# Patient Record
Sex: MALE | Race: WHITE | Employment: FULL TIME | URBAN - METROPOLITAN AREA
[De-identification: names, ages, dates, MRNs, and addresses within clinical notes are randomized per-mention and may not be internally consistent; named-entity substitution may affect disease eponyms.]

---

## 2017-12-15 ENCOUNTER — OFFICE VISIT (OUTPATIENT)
Dept: FAMILY MEDICINE CLINIC | Facility: CLINIC | Age: 52
End: 2017-12-15

## 2017-12-15 DIAGNOSIS — I10 ESSENTIAL HYPERTENSION: Primary | ICD-10-CM

## 2018-10-06 ENCOUNTER — OFFICE VISIT (OUTPATIENT)
Dept: FAMILY MEDICINE CLINIC | Facility: CLINIC | Age: 53
End: 2018-10-06
Payer: COMMERCIAL

## 2018-10-06 VITALS
HEIGHT: 66 IN | HEART RATE: 104 BPM | SYSTOLIC BLOOD PRESSURE: 170 MMHG | WEIGHT: 248 LBS | TEMPERATURE: 98 F | BODY MASS INDEX: 39.86 KG/M2 | RESPIRATION RATE: 16 BRPM | DIASTOLIC BLOOD PRESSURE: 110 MMHG

## 2018-10-06 DIAGNOSIS — I10 UNCONTROLLED HYPERTENSION: ICD-10-CM

## 2018-10-06 DIAGNOSIS — L30.9 ECZEMA, UNSPECIFIED TYPE: ICD-10-CM
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
## 2018-10-06 DIAGNOSIS — S49.91XA INJURY OF RIGHT SHOULDER, INITIAL ENCOUNTER: Primary | ICD-10-CM

## 2018-10-06 DIAGNOSIS — Z13.228 SCREENING FOR ENDOCRINE, NUTRITIONAL, METABOLIC AND IMMUNITY DISORDER: ICD-10-CM

## 2018-10-06 DIAGNOSIS — Z12.11 SCREEN FOR COLON CANCER: ICD-10-CM

## 2018-10-06 DIAGNOSIS — I10 ESSENTIAL HYPERTENSION: ICD-10-CM

## 2018-10-06 DIAGNOSIS — Z13.0 SCREENING FOR ENDOCRINE, NUTRITIONAL, METABOLIC AND IMMUNITY DISORDER: ICD-10-CM

## 2018-10-06 DIAGNOSIS — Z13.29 SCREENING FOR ENDOCRINE, NUTRITIONAL, METABOLIC AND IMMUNITY DISORDER: ICD-10-CM

## 2018-10-06 DIAGNOSIS — Z13.21 SCREENING FOR ENDOCRINE, NUTRITIONAL, METABOLIC AND IMMUNITY DISORDER: ICD-10-CM

## 2018-10-06 PROCEDURE — 99214 OFFICE O/P EST MOD 30 MIN: CPT | Performed by: FAMILY MEDICINE

## 2018-10-06 RX ORDER — BETAMETHASONE DIPROPIONATE 0.05 %
OINTMENT (GRAM) TOPICAL
Qty: 45 G | Refills: 1 | Status: SHIPPED | OUTPATIENT
Start: 2018-10-06 | End: 2018-10-22

## 2018-10-06 RX ORDER — AMLODIPINE BESYLATE 10 MG/1
TABLET ORAL
COMMUNITY
Start: 2018-10-04 | End: 2019-07-31

## 2018-10-06 RX ORDER — CYCLOBENZAPRINE HCL 10 MG
10 TABLET ORAL 2 TIMES DAILY PRN
Qty: 30 TABLET | Refills: 0 | Status: SHIPPED | OUTPATIENT
Start: 2018-10-06 | End: 2018-10-26

## 2018-10-06 RX ORDER — NAPROXEN SODIUM 550 MG/1
550 TABLET ORAL 2 TIMES DAILY PRN
Qty: 30 TABLET | Refills: 1 | Status: SHIPPED | OUTPATIENT
Start: 2018-10-06 | End: 2018-11-14

## 2018-10-06 RX ORDER — LOSARTAN POTASSIUM AND HYDROCHLOROTHIAZIDE 25; 100 MG/1; MG/1
1 TABLET ORAL DAILY
Qty: 30 TABLET | Refills: 6 | Status: SHIPPED | OUTPATIENT
Start: 2018-10-06 | End: 2018-12-04

## 2018-10-06 NOTE — PROGRESS NOTES
Yasir Quintero is a 52year old male. HPI:       Patient's been out of his blood pressure medication of HCTZ 25 mg for Hypertension. He currently denies any new complaints.   His blood pressure has been better and remains on avg 200's over 100's, has bee lb   BMI 40.03 kg/m²   GENERAL: well developed, well nourished,in no apparent distress  SKIN: no rashes,no suspicious lesions  HEENT: atraumatic, normocephalic,ears-normal. and throat are clear, he has post nasal drip.    NECK: supple,no adenopathy,no bruit

## 2018-10-08 ENCOUNTER — TELEPHONE (OUTPATIENT)
Dept: FAMILY MEDICINE CLINIC | Facility: CLINIC | Age: 53
End: 2018-10-08

## 2018-10-08 NOTE — TELEPHONE ENCOUNTER
Patient signed medical records authorization form for the below Facility to disclose health information to [facility]:      Facility / Provider [address] Phone:   Facility Fax: [phone]    SANTOS sent to scanning. Fax confirmation @ 2:07 p.m.

## 2018-10-12 ENCOUNTER — OFFICE VISIT (OUTPATIENT)
Dept: FAMILY MEDICINE CLINIC | Facility: CLINIC | Age: 53
End: 2018-10-12
Payer: COMMERCIAL

## 2018-10-12 ENCOUNTER — LAB ENCOUNTER (OUTPATIENT)
Dept: LAB | Age: 53
End: 2018-10-12
Attending: FAMILY MEDICINE
Payer: COMMERCIAL

## 2018-10-12 VITALS
HEART RATE: 92 BPM | TEMPERATURE: 98 F | SYSTOLIC BLOOD PRESSURE: 140 MMHG | BODY MASS INDEX: 40.5 KG/M2 | HEIGHT: 66 IN | DIASTOLIC BLOOD PRESSURE: 102 MMHG | WEIGHT: 252 LBS | RESPIRATION RATE: 16 BRPM

## 2018-10-12 DIAGNOSIS — S49.91XD SHOULDER INJURY, RIGHT, SUBSEQUENT ENCOUNTER: ICD-10-CM

## 2018-10-12 DIAGNOSIS — I10 ESSENTIAL HYPERTENSION: Primary | ICD-10-CM

## 2018-10-12 DIAGNOSIS — S46.911D SHOULDER STRAIN, RIGHT, SUBSEQUENT ENCOUNTER: ICD-10-CM
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
## 2018-10-12 DIAGNOSIS — Z13.0 SCREENING FOR ENDOCRINE, NUTRITIONAL, METABOLIC AND IMMUNITY DISORDER: ICD-10-CM

## 2018-10-12 DIAGNOSIS — Z13.29 SCREENING FOR ENDOCRINE, NUTRITIONAL, METABOLIC AND IMMUNITY DISORDER: ICD-10-CM

## 2018-10-12 DIAGNOSIS — Z13.228 SCREENING FOR ENDOCRINE, NUTRITIONAL, METABOLIC AND IMMUNITY DISORDER: ICD-10-CM

## 2018-10-12 DIAGNOSIS — Z13.21 SCREENING FOR ENDOCRINE, NUTRITIONAL, METABOLIC AND IMMUNITY DISORDER: ICD-10-CM

## 2018-10-12 PROCEDURE — 84443 ASSAY THYROID STIM HORMONE: CPT

## 2018-10-12 PROCEDURE — 85025 COMPLETE CBC W/AUTO DIFF WBC: CPT

## 2018-10-12 PROCEDURE — 99214 OFFICE O/P EST MOD 30 MIN: CPT | Performed by: FAMILY MEDICINE

## 2018-10-12 PROCEDURE — 80053 COMPREHEN METABOLIC PANEL: CPT

## 2018-10-12 PROCEDURE — 80061 LIPID PANEL: CPT

## 2018-10-12 PROCEDURE — 36415 COLL VENOUS BLD VENIPUNCTURE: CPT

## 2018-11-06 ENCOUNTER — TELEPHONE (OUTPATIENT)
Dept: FAMILY MEDICINE CLINIC | Facility: CLINIC | Age: 53
End: 2018-11-06

## 2018-11-06 NOTE — TELEPHONE ENCOUNTER
Pt came into office, with orders from Dr. Silvestre Pichardo. Pt will be scheduling his surgery soon and will contact us to schedule his preop appt. Pt wanted us to have his surgery orders now.  Pink sheet was filled out for Pt and was placed in the Doctors fol

## 2018-11-07 DIAGNOSIS — Z01.818 PRE-OP EVALUATION: Primary | ICD-10-CM

## 2018-11-12 ENCOUNTER — MED REC SCAN ONLY (OUTPATIENT)
Dept: FAMILY MEDICINE CLINIC | Facility: CLINIC | Age: 53
End: 2018-11-12

## 2018-11-12 ENCOUNTER — TELEPHONE (OUTPATIENT)
Dept: FAMILY MEDICINE CLINIC | Facility: CLINIC | Age: 53
End: 2018-11-12

## 2018-11-12 NOTE — TELEPHONE ENCOUNTER
Pt is scheduled for his pre op on 11/14/18 for his surgery on 12/06/18 with Dr. Josiah Thornton at [facility] [place]. Deangelo alcocer in Dr dugan.

## 2018-11-14 ENCOUNTER — LAB ENCOUNTER (OUTPATIENT)
Dept: LAB | Age: 53
End: 2018-11-14
Attending: FAMILY MEDICINE
Payer: COMMERCIAL

## 2018-11-14 ENCOUNTER — OFFICE VISIT (OUTPATIENT)
Dept: FAMILY MEDICINE CLINIC | Facility: CLINIC | Age: 53
End: 2018-11-14
Payer: COMMERCIAL

## 2018-11-14 ENCOUNTER — HOSPITAL ENCOUNTER (OUTPATIENT)
Dept: GENERAL RADIOLOGY | Age: 53
Discharge: HOME | End: 2018-11-14
Attending: FAMILY MEDICINE
Payer: COMMERCIAL

## 2018-11-14 DIAGNOSIS — Z01.818 PRE-OP EVALUATION: ICD-10-CM

## 2018-11-14 DIAGNOSIS — E55.9 VITAMIN D DEFICIENCY: ICD-10-CM

## 2018-11-14 DIAGNOSIS — E78.5 HYPERLIPIDEMIA WITH TARGET LOW DENSITY LIPOPROTEIN (LDL) CHOLESTEROL LESS THAN 130 MG/DL: ICD-10-CM

## 2018-11-14 DIAGNOSIS — Z01.818 PRE-OP EVALUATION: Primary | ICD-10-CM

## 2018-11-14 DIAGNOSIS — I10 ESSENTIAL HYPERTENSION: ICD-10-CM

## 2018-11-14 PROCEDURE — 81001 URINALYSIS AUTO W/SCOPE: CPT

## 2018-11-14 PROCEDURE — 36415 COLL VENOUS BLD VENIPUNCTURE: CPT

## 2018-11-14 PROCEDURE — 80053 COMPREHEN METABOLIC PANEL: CPT

## 2018-11-14 PROCEDURE — 87086 URINE CULTURE/COLONY COUNT: CPT

## 2018-11-14 PROCEDURE — 71046 X-RAY EXAM CHEST 2 VIEWS: CPT | Performed by: FAMILY MEDICINE

## 2018-11-14 PROCEDURE — 85025 COMPLETE CBC W/AUTO DIFF WBC: CPT

## 2018-11-14 PROCEDURE — 99243 OFF/OP CNSLTJ NEW/EST LOW 30: CPT | Performed by: FAMILY MEDICINE

## 2018-11-14 PROCEDURE — 85610 PROTHROMBIN TIME: CPT

## 2018-11-14 PROCEDURE — 93000 ELECTROCARDIOGRAM COMPLETE: CPT | Performed by: FAMILY MEDICINE

## 2018-11-14 RX ORDER — LOSARTAN POTASSIUM 50 MG/1
50 TABLET ORAL DAILY
Qty: 30 TABLET | Refills: 3 | Status: SHIPPED | OUTPATIENT
Start: 2018-11-14 | End: 2019-07-31

## 2018-11-14 NOTE — PROGRESS NOTES
Kelli Bowling is a 52year old male who presents for a pre-operative physical exam.   HPI related to surgery:   Kelli Bowling is presenting for surgery per request of Dr. Faisal Nair scheduled for a right shoulder SLAP, rotator cuff repair, possible subscapula vomiting, constipation, diarrhea;   GENITAL/: no dysuria, urgency or frequency  MUSCULOSKELETAL: no joint problems upper or lower extremities  NEURO: no convulsions, abnormal sensation, no issues with sleeping.   PSYCHE: no hyper- or hypoactivity, good en

## 2018-11-15 ENCOUNTER — TELEPHONE (OUTPATIENT)
Dept: FAMILY MEDICINE CLINIC | Facility: CLINIC | Age: 53
End: 2018-11-15

## 2018-11-15 NOTE — TELEPHONE ENCOUNTER
----- Message from Shabana Fowler MD sent at 11/15/2018 10:11 AM CST -----  Normal, recheck UA in 3 months. Cleared for surgery.

## 2018-11-15 NOTE — TELEPHONE ENCOUNTER
Urine culture is still in process. Called patient and spoke with him. Advised patient of chest xray results below. Also advised that patient of blood lab results below.   Advised patient that urine culture is still in process and once we have this re

## 2018-11-16 VITALS
HEART RATE: 64 BPM | RESPIRATION RATE: 16 BRPM | SYSTOLIC BLOOD PRESSURE: 138 MMHG | HEIGHT: 66 IN | DIASTOLIC BLOOD PRESSURE: 88 MMHG | TEMPERATURE: 98 F | BODY MASS INDEX: 39.7 KG/M2 | WEIGHT: 247 LBS

## 2018-11-16 NOTE — TELEPHONE ENCOUNTER
Collected:  11/14/2018  1:34 PM   Status:  Final result   Dx:  Pre-op evaluation   Specimen Information: Urine, clean catch        URINE CULTURE No Growth at 18-24 hrs.           Resulting Agency: [facility]         Specimen Collected: 11/14/18  1:34 PM

## 2018-11-16 NOTE — TELEPHONE ENCOUNTER
Patient called checking on the status to make sure he is ok to have his surgery that was moved up . Please call patient at [phone].

## 2018-11-19 ENCOUNTER — TELEPHONE (OUTPATIENT)
Dept: FAMILY MEDICINE CLINIC | Facility: CLINIC | Age: 53
End: 2018-11-19

## 2018-11-19 NOTE — TELEPHONE ENCOUNTER
Called patient and spoke with him. Patient states that on Thursday he took his first dose of Losartan 50mg. Patient woke up on Friday with a \"slight\" fever, body aches and joint pains. Patient states that he thought he was getting the flu.   Patient di

## 2018-11-19 NOTE — TELEPHONE ENCOUNTER
Losartan Potassium 50 MG Oral Tab Sig :  Take 1 tablet (50 mg total) by mouth daily. The patient had a fever after starting this medication. The patient doesn't know if he should be taking only half?      The patient took one pill and felt like he had a

## 2018-11-21 NOTE — TELEPHONE ENCOUNTER
Called patient and spoke with him. Advised him of information below. Patient states understanding. Patient states that he will retry the medication but after the Thanksgiving Holiday because he does not want to feel ill for the holiday.   All questions a

## 2018-11-21 NOTE — TELEPHONE ENCOUNTER
I would retry taking medication and is feeling warm to continue with medication as long as symptoms are stable. Fever is not a side effect of medication.

## 2018-12-04 DIAGNOSIS — I10 UNCONTROLLED HYPERTENSION: ICD-10-CM

## 2018-12-04 RX ORDER — LOSARTAN POTASSIUM AND HYDROCHLOROTHIAZIDE 25; 100 MG/1; MG/1
1 TABLET ORAL DAILY
Qty: 90 TABLET | Refills: 1 | Status: SHIPPED | OUTPATIENT
Start: 2018-12-04 | End: 2019-03-27

## 2018-12-04 NOTE — TELEPHONE ENCOUNTER
Patient needs the following medication sent to [facility] and for 90 tablets: Losartan 100-25 MG.

## 2018-12-04 NOTE — TELEPHONE ENCOUNTER
Patient has Losartan 50mg and Losartan HCTZ 100mg/25mg on chart. Called patient and spoke with him. Patient states verified medication patient needed refilled. Patient states that its the Losartan HCTZ 100mg/25mg.   Patient states that he is tolerating t

## 2019-03-27 DIAGNOSIS — I10 UNCONTROLLED HYPERTENSION: ICD-10-CM

## 2019-03-27 RX ORDER — LOSARTAN POTASSIUM AND HYDROCHLOROTHIAZIDE 25; 100 MG/1; MG/1
1 TABLET ORAL DAILY
Qty: 30 TABLET | Refills: 3 | Status: SHIPPED | OUTPATIENT
Start: 2019-03-27 | End: 2019-07-31

## 2019-03-27 NOTE — TELEPHONE ENCOUNTER
Medication(s) to Refill:   Requested Prescriptions     Pending Prescriptions Disp Refills   • LOSARTAN POTASSIUM-HCTZ 100-25 MG Oral Tab [Pharmacy Med Name: LOSARTAN-HCTZ 100-25 MG TAB] 30 tablet 3     Sig: TAKE 1 TABLET BY MOUTH DAILY         Reason for M

## 2019-04-09 ENCOUNTER — PATIENT OUTREACH (OUTPATIENT)
Dept: FAMILY MEDICINE CLINIC | Facility: CLINIC | Age: 54
End: 2019-04-09

## 2019-04-09 DIAGNOSIS — Z12.11 SCREENING FOR COLON CANCER: Primary | ICD-10-CM

## 2019-04-09 NOTE — PROGRESS NOTES
Contacted patient and he stated he has been off work so he does not have insurance. Patient stated when he gets insurance he will make a appointment. Let him know I put in a referral for [facility] when he gets his insurance.

## 2019-07-30 ENCOUNTER — TELEPHONE (OUTPATIENT)
Dept: FAMILY MEDICINE CLINIC | Facility: CLINIC | Age: 54
End: 2019-07-30

## 2019-07-31 ENCOUNTER — OFFICE VISIT (OUTPATIENT)
Dept: FAMILY MEDICINE CLINIC | Facility: CLINIC | Age: 54
End: 2019-07-31
Payer: COMMERCIAL

## 2019-07-31 VITALS
DIASTOLIC BLOOD PRESSURE: 78 MMHG | TEMPERATURE: 98 F | HEIGHT: 66 IN | BODY MASS INDEX: 40.02 KG/M2 | HEART RATE: 70 BPM | RESPIRATION RATE: 16 BRPM | WEIGHT: 249 LBS | SYSTOLIC BLOOD PRESSURE: 128 MMHG

## 2019-07-31 DIAGNOSIS — E78.5 HYPERLIPIDEMIA WITH TARGET LOW DENSITY LIPOPROTEIN (LDL) CHOLESTEROL LESS THAN 130 MG/DL: ICD-10-CM

## 2019-07-31 DIAGNOSIS — Z13.228 SCREENING FOR ENDOCRINE, NUTRITIONAL, METABOLIC AND IMMUNITY DISORDER: ICD-10-CM

## 2019-07-31 DIAGNOSIS — Z00.00 ANNUAL PHYSICAL EXAM: Primary | ICD-10-CM

## 2019-07-31 DIAGNOSIS — Z13.21 SCREENING FOR ENDOCRINE, NUTRITIONAL, METABOLIC AND IMMUNITY DISORDER: ICD-10-CM

## 2019-07-31 DIAGNOSIS — J40 BRONCHITIS: ICD-10-CM

## 2019-07-31 DIAGNOSIS — Z12.11 SCREEN FOR COLON CANCER: ICD-10-CM

## 2019-07-31 DIAGNOSIS — I10 ESSENTIAL HYPERTENSION: ICD-10-CM

## 2019-07-31 DIAGNOSIS — Z13.29 SCREENING FOR ENDOCRINE, NUTRITIONAL, METABOLIC AND IMMUNITY DISORDER: ICD-10-CM

## 2019-07-31 DIAGNOSIS — Z12.5 SCREENING PSA (PROSTATE SPECIFIC ANTIGEN): ICD-10-CM

## 2019-07-31 DIAGNOSIS — Z13.0 SCREENING FOR ENDOCRINE, NUTRITIONAL, METABOLIC AND IMMUNITY DISORDER: ICD-10-CM

## 2019-07-31 DIAGNOSIS — J32.0 CHRONIC MAXILLARY SINUSITIS: ICD-10-CM

## 2019-07-31 PROCEDURE — 99396 PREV VISIT EST AGE 40-64: CPT | Performed by: FAMILY MEDICINE

## 2019-07-31 PROCEDURE — 99214 OFFICE O/P EST MOD 30 MIN: CPT | Performed by: FAMILY MEDICINE

## 2019-07-31 RX ORDER — LOSARTAN POTASSIUM AND HYDROCHLOROTHIAZIDE 25; 100 MG/1; MG/1
1 TABLET ORAL DAILY
Qty: 90 TABLET | Refills: 3 | Status: SHIPPED | OUTPATIENT
Start: 2019-07-31 | End: 2020-05-05 | Stop reason: CLARIF

## 2019-07-31 RX ORDER — MONTELUKAST SODIUM 10 MG/1
10 TABLET ORAL DAILY
Qty: 30 TABLET | Refills: 3 | Status: SHIPPED | OUTPATIENT
Start: 2019-07-31 | End: 2019-11-26

## 2019-07-31 NOTE — PROGRESS NOTES
Miguel Esteban is a 62year old male. HPI:       Patient presents for recheck of his hypertension. Pt has been taking medications as instructed, no medication side effects, home BP monitoring in the range of 543'H systolic and 49'E diastolic.   Patient's b well nourished,in no apparent distress  SKIN: no rashes,no suspicious lesions  HEENT: atraumatic, normocephalic,ears-normal. and throat are clear, he has post nasal drip. NECK: supple,no adenopathy,no bruits  LUNGS: clear,  No wheezing or rhonchi.    CARD

## 2019-08-05 ENCOUNTER — TELEPHONE (OUTPATIENT)
Dept: FAMILY MEDICINE CLINIC | Facility: CLINIC | Age: 54
End: 2019-08-05

## 2019-08-05 NOTE — TELEPHONE ENCOUNTER
Patient Juan Galindo calling because script for antibiotic to treat sinus infection was not prescribed at 7-31 OV as discussed, please send to [facility]

## 2019-08-05 NOTE — TELEPHONE ENCOUNTER
Patient was seen on 7/31/19 and thought he was to get an antibiotic but nothing was sent. Please advise. Thank you! 7. Bronchitis  -stable, CPM     8.  Chronic maxillary sinusitis  -allergy control and oral abx for bacterial sinus infection     Follow

## 2019-08-06 RX ORDER — AZITHROMYCIN 250 MG/1
TABLET, FILM COATED ORAL
Qty: 6 TABLET | Refills: 0 | Status: SHIPPED | OUTPATIENT
Start: 2019-08-06 | End: 2020-05-05 | Stop reason: CLARIF

## 2019-08-06 NOTE — TELEPHONE ENCOUNTER
Rx sent to pharmacy for patient as requested. Called patient and spoke with him. Advised him of this information. Patient states understanding.

## 2019-08-19 ENCOUNTER — MED REC SCAN ONLY (OUTPATIENT)
Dept: FAMILY MEDICINE CLINIC | Facility: CLINIC | Age: 54
End: 2019-08-19

## 2019-09-10 ENCOUNTER — TELEPHONE (OUTPATIENT)
Dept: FAMILY MEDICINE CLINIC | Facility: CLINIC | Age: 54
End: 2019-09-10

## 2019-10-17 ENCOUNTER — TELEPHONE (OUTPATIENT)
Dept: FAMILY MEDICINE CLINIC | Facility: CLINIC | Age: 54
End: 2019-10-17

## 2019-10-28 RX ORDER — LOSARTAN POTASSIUM 100 MG/1
100 TABLET ORAL DAILY
Qty: 90 TABLET | Refills: 0 | Status: SHIPPED | OUTPATIENT
Start: 2019-10-28 | End: 2020-05-05

## 2019-10-28 RX ORDER — HYDROCHLOROTHIAZIDE 25 MG/1
25 TABLET ORAL DAILY
Qty: 90 TABLET | Refills: 0 | Status: SHIPPED | OUTPATIENT
Start: 2019-10-28 | End: 2020-05-05 | Stop reason: CLARIF

## 2019-11-09 ENCOUNTER — APPOINTMENT (OUTPATIENT)
Dept: LAB | Age: 54
End: 2019-11-09
Attending: FAMILY MEDICINE
Payer: COMMERCIAL

## 2019-11-09 DIAGNOSIS — Z12.5 SCREENING PSA (PROSTATE SPECIFIC ANTIGEN): ICD-10-CM

## 2019-11-13 ENCOUNTER — TELEPHONE (OUTPATIENT)
Dept: FAMILY MEDICINE CLINIC | Facility: CLINIC | Age: 54
End: 2019-11-13

## 2019-11-13 DIAGNOSIS — E78.5 HYPERLIPIDEMIA WITH TARGET LOW DENSITY LIPOPROTEIN (LDL) CHOLESTEROL LESS THAN 130 MG/DL: Primary | ICD-10-CM

## 2019-11-13 DIAGNOSIS — R73.09 ELEVATED GLUCOSE: ICD-10-CM

## 2019-11-13 NOTE — TELEPHONE ENCOUNTER
----- Message from Rachael Daily MD sent at 11/13/2019 10:09 AM CST -----  Patient's sugars slightly borderline as well as high cholesterol okay for low-carb diet need recheck of CMP and lipid in 6 months    PSA SCREEN   Notes recorded by Natalie Mcdonald MD o

## 2019-11-26 RX ORDER — MONTELUKAST SODIUM 10 MG/1
TABLET ORAL
Qty: 90 TABLET | Refills: 1 | Status: SHIPPED | OUTPATIENT
Start: 2019-11-26 | End: 2020-05-05

## 2019-11-26 NOTE — TELEPHONE ENCOUNTER
Medication(s) to Refill:   Requested Prescriptions     Pending Prescriptions Disp Refills   • MONTELUKAST SODIUM 10 MG Oral Tab [Pharmacy Med Name: MONTELUKAST SOD 10 MG TABLET] 90 tablet 1     Sig: TAKE 1 TABLET BY MOUTH EVERY DAY         Reason for Medic

## 2020-02-28 RX ORDER — LOSARTAN POTASSIUM 100 MG/1
100 TABLET ORAL DAILY
Qty: 90 TABLET | Refills: 0 | Status: SHIPPED | OUTPATIENT
Start: 2020-02-28 | End: 2020-05-05 | Stop reason: CLARIF

## 2020-02-28 RX ORDER — HYDROCHLOROTHIAZIDE 25 MG/1
25 TABLET ORAL DAILY
Qty: 90 TABLET | Refills: 0 | Status: SHIPPED | OUTPATIENT
Start: 2020-02-28 | End: 2020-05-05 | Stop reason: CLARIF

## 2020-05-05 ENCOUNTER — VIRTUAL PHONE E/M (OUTPATIENT)
Dept: FAMILY MEDICINE CLINIC | Facility: CLINIC | Age: 55
End: 2020-05-05
Payer: COMMERCIAL

## 2020-05-05 VITALS
SYSTOLIC BLOOD PRESSURE: 120 MMHG | BODY MASS INDEX: 40 KG/M2 | WEIGHT: 248 LBS | HEART RATE: 76 BPM | DIASTOLIC BLOOD PRESSURE: 70 MMHG

## 2020-05-05 DIAGNOSIS — E55.9 VITAMIN D DEFICIENCY: ICD-10-CM

## 2020-05-05 DIAGNOSIS — I10 ESSENTIAL HYPERTENSION: Primary | ICD-10-CM

## 2020-05-05 DIAGNOSIS — E78.5 HYPERLIPIDEMIA WITH TARGET LOW DENSITY LIPOPROTEIN (LDL) CHOLESTEROL LESS THAN 130 MG/DL: ICD-10-CM

## 2020-05-05 PROCEDURE — 99214 OFFICE O/P EST MOD 30 MIN: CPT | Performed by: FAMILY MEDICINE

## 2020-05-05 RX ORDER — LOSARTAN POTASSIUM 100 MG/1
TABLET ORAL
Qty: 90 TABLET | Refills: 0 | OUTPATIENT
Start: 2020-05-05

## 2020-05-05 RX ORDER — HYDROCHLOROTHIAZIDE 25 MG/1
TABLET ORAL
Qty: 90 TABLET | Refills: 0 | OUTPATIENT
Start: 2020-05-05

## 2020-05-05 RX ORDER — MONTELUKAST SODIUM 10 MG/1
10 TABLET ORAL DAILY
Qty: 90 TABLET | Refills: 1 | Status: SHIPPED | OUTPATIENT
Start: 2020-05-05 | End: 2021-08-18

## 2020-05-05 RX ORDER — LOSARTAN POTASSIUM 100 MG/1
100 TABLET ORAL DAILY
Qty: 90 TABLET | Refills: 1 | Status: SHIPPED | OUTPATIENT
Start: 2020-05-05 | End: 2021-08-18

## 2020-05-05 NOTE — PROGRESS NOTES
Please note that the following visit was completed using two-way, real-time interactive audio and video communication.   This has been done in good greta to provide continuity of care in the best interest of the provider-patient relationship, due to the on- back   • OTHER SURGICAL HISTORY      right shoulder     Social History:  Social History    Tobacco Use      Smoking status: Never Smoker      Smokeless tobacco: Never Used    Alcohol use: Yes      Comment: occasion    Drug use: No    Family History:  N oriented to person, place, and time. Psychiatric: He has a normal mood and affect. His behavior is normal. Judgment and thought content normal.        ASSESSMENT AND PLAN:   Diagnoses and all orders for this visit:  1.  Essential hypertension  -stable, wi

## 2020-11-20 ENCOUNTER — OFFICE VISIT (OUTPATIENT)
Dept: FAMILY MEDICINE CLINIC | Facility: CLINIC | Age: 55
End: 2020-11-20
Payer: COMMERCIAL

## 2020-11-20 VITALS
DIASTOLIC BLOOD PRESSURE: 92 MMHG | HEIGHT: 66 IN | OXYGEN SATURATION: 98 % | SYSTOLIC BLOOD PRESSURE: 144 MMHG | WEIGHT: 248 LBS | TEMPERATURE: 98 F | RESPIRATION RATE: 16 BRPM | HEART RATE: 73 BPM | BODY MASS INDEX: 39.86 KG/M2

## 2020-11-20 DIAGNOSIS — Z20.822 SUSPECTED COVID-19 VIRUS INFECTION: Primary | ICD-10-CM

## 2020-11-20 PROCEDURE — 3077F SYST BP >= 140 MM HG: CPT | Performed by: NURSE PRACTITIONER

## 2020-11-20 PROCEDURE — 99213 OFFICE O/P EST LOW 20 MIN: CPT | Performed by: NURSE PRACTITIONER

## 2020-11-20 PROCEDURE — 3080F DIAST BP >= 90 MM HG: CPT | Performed by: NURSE PRACTITIONER

## 2020-11-20 PROCEDURE — 3008F BODY MASS INDEX DOCD: CPT | Performed by: NURSE PRACTITIONER

## 2020-11-20 NOTE — PATIENT INSTRUCTIONS
Coronavirus Disease 2019 (COVID-19): Overview  Coronavirus disease 2019 (COVID-19) is a respiratory illness. It's caused by a new (novel) coronavirus. There are many types of coronavirus. Coronaviruses are a very common cause of colds and bronchitis.  The · New loss of sense of smell or taste  You can check your symptoms with the CDC’s Coronavirus Self-. What are possible complications from [id number]-[id number]? In many cases, this virus can cause infection (pneumonia) in both lungs.  In some cases, this can ca Your healthcare provider will ask about your symptoms. He or she will ask where you live, and about your recent travel, and any contact with sick people.  If your healthcare provider thinks you may have COVID-19, he or she will consider whether to test you · Imaging tests. You may have a chest X-ray or CT scan. Note about reinfection and your immunity  At this time, it's unclear if people can be reinfected with COVID-19.  The CDC notes that if a person has fully recovered from COVID-19 and is retested [facility] · Prone positioning. Depending on how sick you are during your hospital stay, your healthcare team may turn you regularly on your stomach. This is called prone positioning. It helps increase the amount of oxygen you get to your lungs.  Follow your healthca You are at risk for COVID-19 if you have had close contact with someone with the virus, or if you live in or traveled to an area with cases of it. Close contact means being within 6 feet of a person known to have COVID-19 for a total of 15 minutes or more.

## 2020-11-20 NOTE — PROGRESS NOTES
CHIEF COMPLAINT:   Patient presents with:  Testing: covid test, no exposure, no sx      HPI:   Piper Colon is a 60year old male who presents for a possible Covid 19 exposure.   He is unsure if he has been exposed however he states he works around [employer] THROAT: Oral mucosa pink, moist. Posterior pharynx is non erythematous. no exudates. NECK: Supple, non-tender  LUNGS: clear to auscultation bilaterally; good air movement. Breathing is non labored.   CARDIO: RRR without murmur  GI: active BS's x4,no mas Public health officials are working to find the source. How the virus spreads is not yet fully understood, but it seems to spread and infect people fairly easily.  Some people who have been infected in an area may not be sure how or where they were infected As experts learn more about [id number]-89, other complications are being reported that may be linked to COVID-19. Rarely, some children have developed severe complications called multisystem inflammatory syndrome in children (MIS-C).  MIS-C seems to be similar t · Viral test.  Viral tests tell if you have a current COVID-19 infection. A nose-throat swab may be wiped inside your nose to the back of your throat. Or a sample of your saliva may be taken.  Either of these samples will be checked for the SARS-CoV-2 virus At this time, it's unclear if people can be reinfected with COVID-19.  The CDC notes that if a person has fully recovered from COVID-19 and is retested within 3 months of the first infection, they may continue to have low levels of the virus in their body a · Prone positioning. Depending on how sick you are during your hospital stay, your healthcare team may turn you regularly on your stomach. This is called prone positioning. It helps increase the amount of oxygen you get to your lungs.  Follow your healthca You are at risk for COVID-19 if you have had close contact with someone with the virus, or if you live in or traveled to an area with cases of it. Close contact means being within 6 feet of a person known to have COVID-19 for a total of 15 minutes or more.

## 2020-11-25 ENCOUNTER — VIRTUAL PHONE E/M (OUTPATIENT)
Dept: FAMILY MEDICINE CLINIC | Facility: CLINIC | Age: 55
End: 2020-11-25
Payer: COMMERCIAL

## 2020-11-25 DIAGNOSIS — U07.1 COVID-19 VIRUS INFECTION: Primary | ICD-10-CM

## 2020-11-25 DIAGNOSIS — I10 ESSENTIAL HYPERTENSION: ICD-10-CM

## 2020-11-25 DIAGNOSIS — E78.5 HYPERLIPIDEMIA WITH TARGET LOW DENSITY LIPOPROTEIN (LDL) CHOLESTEROL LESS THAN 130 MG/DL: ICD-10-CM

## 2020-11-25 DIAGNOSIS — E55.9 VITAMIN D DEFICIENCY: ICD-10-CM

## 2020-11-25 PROCEDURE — 99214 OFFICE O/P EST MOD 30 MIN: CPT | Performed by: FAMILY MEDICINE

## 2020-11-25 NOTE — PROGRESS NOTES
TELE HEALTH VISIT     HPI:    Frances Christie is a 60year old male who presents for No chief complaint on file. Presenting via Tele health due to pandemic:   He reports mild s/s. Positive covid 11/20/20.    He reports feeling fatigue, he reports no re REVIEW OF SYSTEMS:   Patient denies shortness of breath, denies chest pain and denies any recent fevers or chills. Patient reports no urinary complaints and denies headaches or visual disturbances.    Patient denies any abdominal pain at this t Additionally, every conscious effort was taken to allow for sufficient and adequate time. This billing visit was spent on reviewing labs, medications, radiology tests and decision making.   Appropriate medical decision-making and tests are ordered as d

## 2021-08-18 ENCOUNTER — OFFICE VISIT (OUTPATIENT)
Dept: FAMILY MEDICINE CLINIC | Facility: CLINIC | Age: 56
End: 2021-08-18
Payer: COMMERCIAL

## 2021-08-18 VITALS
BODY MASS INDEX: 39.86 KG/M2 | DIASTOLIC BLOOD PRESSURE: 80 MMHG | HEART RATE: 71 BPM | RESPIRATION RATE: 16 BRPM | HEIGHT: 66 IN | SYSTOLIC BLOOD PRESSURE: 120 MMHG | OXYGEN SATURATION: 99 % | WEIGHT: 248 LBS

## 2021-08-18 DIAGNOSIS — J30.2 SEASONAL ALLERGIC RHINITIS, UNSPECIFIED TRIGGER: ICD-10-CM

## 2021-08-18 DIAGNOSIS — Z12.5 SCREENING FOR PROSTATE CANCER: ICD-10-CM

## 2021-08-18 DIAGNOSIS — I10 ESSENTIAL HYPERTENSION: ICD-10-CM

## 2021-08-18 DIAGNOSIS — E78.5 HYPERLIPIDEMIA WITH TARGET LOW DENSITY LIPOPROTEIN (LDL) CHOLESTEROL LESS THAN 130 MG/DL: ICD-10-CM

## 2021-08-18 DIAGNOSIS — E55.9 VITAMIN D DEFICIENCY: ICD-10-CM

## 2021-08-18 DIAGNOSIS — Z00.00 ROUTINE GENERAL MEDICAL EXAMINATION AT HEALTH CARE FACILITY: Primary | ICD-10-CM

## 2021-08-18 PROCEDURE — 3074F SYST BP LT 130 MM HG: CPT | Performed by: FAMILY MEDICINE

## 2021-08-18 PROCEDURE — 3079F DIAST BP 80-89 MM HG: CPT | Performed by: FAMILY MEDICINE

## 2021-08-18 PROCEDURE — 99396 PREV VISIT EST AGE 40-64: CPT | Performed by: FAMILY MEDICINE

## 2021-08-18 PROCEDURE — 3008F BODY MASS INDEX DOCD: CPT | Performed by: FAMILY MEDICINE

## 2021-08-18 RX ORDER — ERGOCALCIFEROL 1.25 MG/1
50000 CAPSULE ORAL WEEKLY
Qty: 12 CAPSULE | Refills: 0 | Status: SHIPPED | OUTPATIENT
Start: 2021-08-18 | End: 2021-09-17

## 2021-08-18 RX ORDER — LOSARTAN POTASSIUM 100 MG/1
100 TABLET ORAL DAILY
Qty: 90 TABLET | Refills: 1 | Status: SHIPPED | OUTPATIENT
Start: 2021-08-18

## 2021-08-18 RX ORDER — MONTELUKAST SODIUM 10 MG/1
10 TABLET ORAL DAILY
Qty: 90 TABLET | Refills: 1 | Status: SHIPPED | OUTPATIENT
Start: 2021-08-18

## 2021-08-18 NOTE — PROGRESS NOTES
Paulina Arce is a 60year old male. HPI:       Patient presents for recheck of his hypertension. Pt has been taking medications as instructed, no medication side effects, home BP monitoring in the range of 326'I systolic and 43'T diastolic.   Patient's b 6\" (1.676 m)   Wt 248 lb (112.5 kg)   SpO2 99%   BMI 40.03 kg/m²   GENERAL: well developed, well nourished,in no apparent distress  SKIN: no rashes,no suspicious lesions  HEENT: atraumatic, normocephalic,ears-normal. and throat are clear, he has post nasa

## 2021-10-20 DIAGNOSIS — E55.9 VITAMIN D DEFICIENCY: ICD-10-CM

## 2021-10-20 RX ORDER — ERGOCALCIFEROL 1.25 MG/1
CAPSULE ORAL
Qty: 12 CAPSULE | Refills: 3 | OUTPATIENT
Start: 2021-10-20

## 2022-01-27 ENCOUNTER — TELEPHONE (OUTPATIENT)
Dept: FAMILY MEDICINE CLINIC | Facility: CLINIC | Age: 57
End: 2022-01-27

## 2022-04-28 ENCOUNTER — TELEPHONE (OUTPATIENT)
Dept: FAMILY MEDICINE CLINIC | Facility: CLINIC | Age: 57
End: 2022-04-28

## 2023-01-11 ENCOUNTER — LAB ENCOUNTER (OUTPATIENT)
Dept: LAB | Age: 58
End: 2023-01-11
Attending: NURSE PRACTITIONER
Payer: COMMERCIAL

## 2023-01-11 DIAGNOSIS — I10 ESSENTIAL HYPERTENSION, MALIGNANT: ICD-10-CM

## 2023-01-11 DIAGNOSIS — I25.10 CORONARY ATHEROSCLEROSIS OF NATIVE CORONARY ARTERY: Primary | ICD-10-CM

## 2023-01-11 LAB
ALBUMIN SERPL-MCNC: 3.8 G/DL (ref 3.4–5)
ALBUMIN/GLOB SERPL: 1.2 {RATIO} (ref 1–2)
ALP LIVER SERPL-CCNC: 84 U/L
ALT SERPL-CCNC: 39 U/L
ANION GAP SERPL CALC-SCNC: 5 MMOL/L (ref 0–18)
AST SERPL-CCNC: 21 U/L (ref 15–37)
BILIRUB SERPL-MCNC: 0.8 MG/DL (ref 0.1–2)
BUN BLD-MCNC: 17 MG/DL (ref 7–18)
CALCIUM BLD-MCNC: 9.1 MG/DL (ref 8.5–10.1)
CHLORIDE SERPL-SCNC: 110 MMOL/L (ref 98–112)
CHOLEST SERPL-MCNC: 159 MG/DL (ref ?–200)
CO2 SERPL-SCNC: 28 MMOL/L (ref 21–32)
CREAT BLD-MCNC: 1 MG/DL
FASTING PATIENT LIPID ANSWER: YES
FASTING STATUS PATIENT QL REPORTED: YES
GFR SERPLBLD BASED ON 1.73 SQ M-ARVRAT: 87 ML/MIN/1.73M2 (ref 60–?)
GLOBULIN PLAS-MCNC: 3.2 G/DL (ref 2.8–4.4)
GLUCOSE BLD-MCNC: 119 MG/DL (ref 70–99)
HDLC SERPL-MCNC: 46 MG/DL (ref 40–59)
LDLC SERPL CALC-MCNC: 92 MG/DL (ref ?–100)
NONHDLC SERPL-MCNC: 113 MG/DL (ref ?–130)
OSMOLALITY SERPL CALC.SUM OF ELEC: 299 MOSM/KG (ref 275–295)
POTASSIUM SERPL-SCNC: 4.8 MMOL/L (ref 3.5–5.1)
PROT SERPL-MCNC: 7 G/DL (ref 6.4–8.2)
SODIUM SERPL-SCNC: 143 MMOL/L (ref 136–145)
TRIGL SERPL-MCNC: 116 MG/DL (ref 30–149)
VLDLC SERPL CALC-MCNC: 19 MG/DL (ref 0–30)

## 2023-01-11 PROCEDURE — 36415 COLL VENOUS BLD VENIPUNCTURE: CPT

## 2023-01-11 PROCEDURE — 80053 COMPREHEN METABOLIC PANEL: CPT

## 2023-01-11 PROCEDURE — 80061 LIPID PANEL: CPT

## 2023-02-03 ENCOUNTER — OFFICE VISIT (OUTPATIENT)
Dept: FAMILY MEDICINE CLINIC | Facility: CLINIC | Age: 58
End: 2023-02-03
Payer: COMMERCIAL

## 2023-02-03 VITALS
DIASTOLIC BLOOD PRESSURE: 84 MMHG | OXYGEN SATURATION: 98 % | WEIGHT: 251 LBS | HEIGHT: 66 IN | HEART RATE: 70 BPM | SYSTOLIC BLOOD PRESSURE: 130 MMHG | RESPIRATION RATE: 16 BRPM | BODY MASS INDEX: 40.34 KG/M2

## 2023-02-03 DIAGNOSIS — I25.10 CORONARY ARTERY DISEASE DUE TO CALCIFIED CORONARY LESION: ICD-10-CM

## 2023-02-03 DIAGNOSIS — Z12.5 SCREENING FOR PROSTATE CANCER: ICD-10-CM

## 2023-02-03 DIAGNOSIS — I25.84 CORONARY ARTERY DISEASE DUE TO CALCIFIED CORONARY LESION: ICD-10-CM

## 2023-02-03 DIAGNOSIS — R73.03 PRE-DIABETES: ICD-10-CM

## 2023-02-03 DIAGNOSIS — Z00.00 ROUTINE GENERAL MEDICAL EXAMINATION AT HEALTH CARE FACILITY: Primary | ICD-10-CM

## 2023-02-03 DIAGNOSIS — L30.9 ECZEMA, UNSPECIFIED TYPE: ICD-10-CM

## 2023-02-03 DIAGNOSIS — Z12.11 SCREENING FOR COLON CANCER: ICD-10-CM

## 2023-02-03 DIAGNOSIS — I10 ESSENTIAL HYPERTENSION: ICD-10-CM

## 2023-02-03 DIAGNOSIS — E78.5 HYPERLIPIDEMIA WITH TARGET LOW DENSITY LIPOPROTEIN (LDL) CHOLESTEROL LESS THAN 130 MG/DL: ICD-10-CM

## 2023-02-03 PROCEDURE — 3079F DIAST BP 80-89 MM HG: CPT | Performed by: FAMILY MEDICINE

## 2023-02-03 PROCEDURE — 3008F BODY MASS INDEX DOCD: CPT | Performed by: FAMILY MEDICINE

## 2023-02-03 PROCEDURE — 3075F SYST BP GE 130 - 139MM HG: CPT | Performed by: FAMILY MEDICINE

## 2023-02-03 PROCEDURE — 99396 PREV VISIT EST AGE 40-64: CPT | Performed by: FAMILY MEDICINE

## 2023-02-03 RX ORDER — ASPIRIN 81 MG/1
81 TABLET ORAL
COMMUNITY

## 2023-02-03 RX ORDER — EZETIMIBE 10 MG/1
TABLET ORAL
COMMUNITY
Start: 2023-01-30

## 2023-02-03 RX ORDER — METOPROLOL SUCCINATE 50 MG/1
50 TABLET, EXTENDED RELEASE ORAL
COMMUNITY
Start: 2022-11-08

## 2023-02-03 RX ORDER — TRIAMCINOLONE ACETONIDE 1 MG/G
CREAM TOPICAL 2 TIMES DAILY PRN
Qty: 60 G | Refills: 0 | Status: SHIPPED | OUTPATIENT
Start: 2023-02-03 | End: 2023-02-17

## 2023-02-03 RX ORDER — CLOTRIMAZOLE AND BETAMETHASONE DIPROPIONATE 10; .64 MG/G; MG/G
1 CREAM TOPICAL 2 TIMES DAILY
COMMUNITY
Start: 2022-12-20

## 2023-02-03 RX ORDER — ATORVASTATIN CALCIUM 80 MG/1
80 TABLET, FILM COATED ORAL NIGHTLY
COMMUNITY
Start: 2022-11-08

## 2024-01-23 NOTE — PROGRESS NOTES
On 01/24/24, Janel ESCOBEDO scribed the services personally performed by Ry Vazquez MD      Chief Complaint   Patient presents with    Office Visit    Follow-up       Visit: Subsequent     SUBJECTIVE:  Rainer Macario is a 35 year old male who presents to the clinic for their second post-op visit. The patient is s/p ANIA, septoplasty, and turbinate reduction on 12/15/2023. At last visit, 12/22/2023, endoscopic debridement was done and patient was started on Budesonide/saline rinses. Today, patient reports he is doing well. He mentions having a cold at end of December 2023 at which point he had some green mucus, but this has since resolved. He mentions flying recently and his ears felt off. He uses Budesonide/saline rinse at night and plain saline rinse in the morning. No facial pain/pressure, headaches, or snoring     PMHx/PSHx/Meds/Allergies/Social Hx/Family Hx/ROS reviewed from previous visit and remain unchanged.    PHYSICAL EXAMINATION:   Height 5' 11\" (1.803 m), weight 95.3 kg (210 lb).  --General appearance: Well developed, well nourished, in no apparent distress  --Ability to communicate: Appropriate  --Head and scalp: No scalp lesions  --Eyes: No redness, swelling or drainage.  --Ears:     R ear: EAC patent, tympanic membrane intact, no middle ear effusion    L ear: EAC patent, tympanic membrane intact, no middle ear effusion  --Oral Cavity/Oral Pharynx: No buccal mucosal lesions, palatal elevation symmetric, tongue motion intact, no floor of mouth palpable masses, no posterior pharyngeal wall fullness  --Nose: Septum healed well. Turbinates nicely reduced.   Respiratory: Good respiratory effort, no stridor or stertor  CV: No JVD  --Neck: Trachea midline,    evidence of hematoma.   --Skin: No pigmented lesions on face, neck  --Psychiatric: Oriented to time, place and person  --Lymphatic: No palpable cervical adenopathy     Procedure Note:     Procedure: Nasal endoscopy, diagnostic  Indication:  Ramona Leone is a 52year old male. HPI:       Patient's been out of his blood pressure medication of HCTZ 25 mg for Hypertension. He currently denies any new complaints.   His blood pressure has been better and remains on avg 200's over 100's, has bee Chronic sinusitis   EBL: None  Complications: None    Right side: Septum midline.  Inferior turbinate non hypertrophied.  middle meatus, and sphenoethmoid recess with Minimal edema, and no purulence or polyps. The middle turbinate was medialized with patent maxillary antrostomy and clear ethmoid labyrinth. Superior meatus  unremarkable.    Nasopharynx clear without mass or lesion.  Minimal mucus noted    Left side: Septum midline.  Inferior turbinate non hypertrophied. middle meatus, and sphenoethmoid recess with Minimal edema, and no purulence or polyps. The middle turbinate was medialized with patent maxillary antrostomy and clear ethmoid labyrinth.  Superior meatus  unremarkable. Nasopharynx clear without mass or lesion.      Patient tolerated procedure well with minimal discomfort.      ASSESSMENT:  Chronic sinusitis s/p ANIA, septoplasty, and turbinate reduction on 12/15/2023    PLAN:  - Patient doing well.   - Rigid nasal endoscopy done today and reviewed with patient as above.   - Continue Budesonide/saline rinses once daily.   - RTC in 4-6 month for recheck.   - Discussed with patient treatment plan as above, and patient agreeable.  All patient's questions and concerns were addressed and answered.    Ry Vazquez MD    Scribe: Electronically signed: Janel Bolden has scribed for Dr. Vazquez, 01/24/24  I have reviewed and edited the progress note and agree with what has been scribed. Electronically signed by: Ry Vazquez MD ,  01/24/24   Take 1 tablet (10 mg total) by mouth 2 (two) times daily as needed. Disp: 30 tablet Rfl: 0      History reviewed. No pertinent past medical history.    Social History:  Social History    Tobacco Use      Smoking status: Never Smoker      Smokeless tobacco:

## 2024-03-18 RX ORDER — TRIAMCINOLONE ACETONIDE 1 MG/G
1 CREAM TOPICAL AS NEEDED
Qty: 30 G | Refills: 1 | OUTPATIENT
Start: 2024-03-18

## 2024-03-18 NOTE — TELEPHONE ENCOUNTER
Annual physical appt is set for 04/08/24.     Pt will also like to know if Dr. Brown can please submit RX for losartan 100 MG Oral Tab prior to OV as he is completely out of this medication.

## 2024-03-18 NOTE — TELEPHONE ENCOUNTER
Requesting refill on triamcinolone cream.   LOV 2/3/2023.  Refill request denied. Due for physical.     PSR: Please assist with scheduling physical.

## 2024-04-08 ENCOUNTER — OFFICE VISIT (OUTPATIENT)
Dept: FAMILY MEDICINE CLINIC | Facility: CLINIC | Age: 59
End: 2024-04-08
Payer: COMMERCIAL

## 2024-04-08 VITALS
HEIGHT: 66 IN | OXYGEN SATURATION: 94 % | SYSTOLIC BLOOD PRESSURE: 138 MMHG | HEART RATE: 61 BPM | RESPIRATION RATE: 14 BRPM | BODY MASS INDEX: 41.78 KG/M2 | DIASTOLIC BLOOD PRESSURE: 84 MMHG | TEMPERATURE: 98 F | WEIGHT: 260 LBS

## 2024-04-08 DIAGNOSIS — Z12.5 SCREENING FOR PROSTATE CANCER: ICD-10-CM

## 2024-04-08 DIAGNOSIS — L03.032 ACUTE PARONYCHIA OF TOE, LEFT: ICD-10-CM

## 2024-04-08 DIAGNOSIS — Z00.00 ROUTINE GENERAL MEDICAL EXAMINATION AT A HEALTH CARE FACILITY: Primary | ICD-10-CM

## 2024-04-08 DIAGNOSIS — E78.5 HYPERLIPIDEMIA WITH TARGET LOW DENSITY LIPOPROTEIN (LDL) CHOLESTEROL LESS THAN 130 MG/DL: ICD-10-CM

## 2024-04-08 DIAGNOSIS — J30.2 SEASONAL ALLERGIC RHINITIS, UNSPECIFIED TRIGGER: ICD-10-CM

## 2024-04-08 DIAGNOSIS — Z12.11 SCREENING FOR COLON CANCER: ICD-10-CM

## 2024-04-08 DIAGNOSIS — L30.9 ECZEMA, UNSPECIFIED TYPE: ICD-10-CM

## 2024-04-08 DIAGNOSIS — I10 ESSENTIAL HYPERTENSION: ICD-10-CM

## 2024-04-08 PROCEDURE — 99214 OFFICE O/P EST MOD 30 MIN: CPT | Performed by: FAMILY MEDICINE

## 2024-04-08 PROCEDURE — 99396 PREV VISIT EST AGE 40-64: CPT | Performed by: FAMILY MEDICINE

## 2024-04-08 RX ORDER — CLOTRIMAZOLE AND BETAMETHASONE DIPROPIONATE 10; .64 MG/G; MG/G
1 CREAM TOPICAL 2 TIMES DAILY
Qty: 1 EACH | Refills: 1 | Status: SHIPPED | OUTPATIENT
Start: 2024-04-08

## 2024-04-08 RX ORDER — MONTELUKAST SODIUM 10 MG/1
10 TABLET ORAL DAILY
Qty: 90 TABLET | Refills: 2 | Status: SHIPPED | OUTPATIENT
Start: 2024-04-08

## 2024-04-08 RX ORDER — LOSARTAN POTASSIUM 100 MG/1
100 TABLET ORAL DAILY
Qty: 90 TABLET | Refills: 2 | Status: SHIPPED | OUTPATIENT
Start: 2024-04-08

## 2024-04-08 NOTE — PROGRESS NOTES
HPI:    Shakeel Aldridge is a 58 year old male who presents for Physical (Going to see Cardiologist and wanting to get healthy /Left foot ingrown toenail Wanting to get it removed. )     Presenting for follow up, history of CAD s/p NSTEMI post PCI DAPT, Beta Blocker, on statin.   On losartan.   High dose statin and Brilinta.   Patient presents for recheck of his hypertension. Pt has been taking medications as instructed, no medication side effects, home BP monitoring in the range of 120's systolic and 70's diastolic.   Patient has left great toe paronychia with problematic toe nail.       Past History:   He  has no past medical history on file.   He  has a past surgical history that includes other surgical history and other surgical history.   His family history is not on file.   He  reports that he has never smoked. He has never used smokeless tobacco. He reports current alcohol use. He reports that he does not use drugs.     He is not on any long-term medications.   He has No Known Allergies.     Current Outpatient Medications on File Prior to Visit   Medication Sig    aspirin 81 MG Oral Tab EC Take 1 tablet (81 mg total) by mouth daily as needed.    atorvastatin 80 MG Oral Tab Take 1 tablet (80 mg total) by mouth nightly.    ticagrelor 90 MG Oral Tab Take 1 tablet (90 mg total) by mouth 2 (two) times daily.    metoprolol succinate ER 50 MG Oral Tablet 24 Hr Take 1 tablet (50 mg total) by mouth daily with food.     No current facility-administered medications on file prior to visit.         REVIEW OF SYSTEMS:   Patient denies shortness of breath, denies chest pain and denies any recent fevers or chills.    Patient reports no urinary complaints and denies headaches or visual disturbances.   Patient denies any abdominal pain at this time. Patient has no new skin lesions.  Patient reports no acute back pain and reports no dizziness or headaches.   Patient reports no visual disturbances and reports hearing has been  about the same.   Patient reports no recent injury or trauma.               EXAM:    /84   Pulse 61   Temp 97.8 °F (36.6 °C)   Resp 14   Ht 5' 6\" (1.676 m)   Wt 260 lb (117.9 kg)   SpO2 94%   BMI 41.97 kg/m²  Estimated body mass index is 41.97 kg/m² as calculated from the following:    Height as of this encounter: 5' 6\" (1.676 m).    Weight as of this encounter: 260 lb (117.9 kg).    General Appearance:  Alert, cooperative, no distress, appears stated age   Head:  Normocephalic, without obvious abnormality, atraumatic   Eyes:  conjunctiva/cornea is not erythematous.        Nose: No nasal drainage.    Throat: No erythema    Neck: Supple, symmetrical, trachea midline, and normal ROM  thyroid: no obvious nodules   Back:   Symmetric, no curvature, ROM normal, no CVA tenderness   Lungs:   Clear to auscultation bilaterally, respirations unlabored   Chest Wall:  No tenderness or deformity   Heart:  Regular rate and rhythm, S1, S2 normal, no murmur,   Abdomen:   Soft, non-tender, bowel sounds active. No hernia.    Genitalia:     Rectal:     Extremities: Extremities normal, atraumatic, no cyanosis or edema   Pulses: 2+ and symmetric   Skin: Skin color, texture, turgor normal, no new rashes    Lymph nodes: No obvious cervical adenopathy.    Neurologic and psych: Normal speech, Alert and oriented x 3.   Normal mood, normal insight and judgment.        Left great toe-ingrown nail.           ASSESSMENT AND PLAN:   Diagnoses and all orders for this visit:    1. Routine general medical examination at a health care facility  -wellness visit was done  - CBC With Differential With Platelet; Future  - Comp Metabolic Panel (14); Future  - Lipid Panel; Future  - TSH W Reflex To Free T4; Future    2. Screening for colon cancer  -due for c-scope  - Surgery Referral - In Network    3. Screening for prostate cancer    - PSA Total, Screen; Future    4. Essential hypertension  -stable, CPM  - losartan 100 MG Oral Tab; Take 1  tablet (100 mg total) by mouth daily.  Dispense: 90 tablet; Refill: 2    5. Seasonal allergic rhinitis, unspecified trigger  -allergy control  - montelukast 10 MG Oral Tab; Take 1 tablet (10 mg total) by mouth daily.  Dispense: 90 tablet; Refill: 2    6. Eczema, unspecified type  -topical rx:  - clotrimazole-betamethasone 1-0.05 % External Cream; Apply 1 Application  topically 2 (two) times daily. APPLY TO AFFECTED AREA  Dispense: 1 each; Refill: 1    7. Acute paronychia of toe, left  Will refer to DPM-will need partial nail removal or full was discussed with patient.   - Podiatry Referral - In Network    8. Hyperlipidemia with target low density lipoprotein (LDL) cholesterol less than 130 mg/dL  -stable, CPM.        Follow up in 3-6 months, sooner prn.

## 2024-10-15 DIAGNOSIS — I10 ESSENTIAL HYPERTENSION: ICD-10-CM

## 2024-10-15 RX ORDER — LOSARTAN POTASSIUM 100 MG/1
100 TABLET ORAL DAILY
Qty: 90 TABLET | Refills: 2 | Status: SHIPPED | OUTPATIENT
Start: 2024-10-15

## 2025-03-24 NOTE — TELEPHONE ENCOUNTER
"The patient is Watcher - Medium risk of patient condition declining or worsening    Shift Goals  Clinical Goals: monitor for need of blood transfusion, tolerate ABX  Patient Goals: pain control and sleep  Family Goals: not atbedside    Progress made toward(s) clinical / shift goals:    Labs are stilling pending.   Problem: Knowledge Deficit - Standard  Goal: Patient and family/care givers will demonstrate understanding of plan of care, disease process/condition, diagnostic tests and medications  Outcome: Progressing  Note: A/OX4, Pt is able to understand plan of care at this time.  Pt tolerating ABX.     Problem: Pain - Standard  Goal: Alleviation of pain or a reduction in pain to the patient’s comfort goal  Outcome: Progressing  Note: Pt needed pain medication once throughout shift. Pt says \"it is more tolerable\".        Patient is not progressing towards the following goals:      " Spoke with pt regarding results and instructions listed below. Advised pt of  Pt verbalizes understanding and requests a copy of his results and instructions be mailed to his home. Confirmed address.      Pt verbalizes understanding with no additional quest

## (undated) NOTE — LETTER
08/30/19        [address] [place] [id number]      Dear Flor Flores records indicate that you have outstanding lab work and or testing that was ordered for you and has not yet been completed:  Orders Placed This Encounter      [facility]